# Patient Record
Sex: FEMALE | Race: BLACK OR AFRICAN AMERICAN | NOT HISPANIC OR LATINO | Employment: STUDENT | URBAN - METROPOLITAN AREA
[De-identification: names, ages, dates, MRNs, and addresses within clinical notes are randomized per-mention and may not be internally consistent; named-entity substitution may affect disease eponyms.]

---
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
## 2024-01-21 PROBLEM — R10.9 ABDOMINAL PAIN: Status: ACTIVE | Noted: 2024-01-21

## 2024-03-21 ENCOUNTER — TELEPHONE (OUTPATIENT)
Dept: OBSTETRICS AND GYNECOLOGY | Facility: CLINIC | Age: 19
End: 2024-03-21
Payer: MEDICAID

## 2024-03-21 NOTE — TELEPHONE ENCOUNTER
Called patient to schedule appt                ----- Message from Matt Hines sent at 3/21/2024  4:29 PM CDT -----  Who Called:GRUPO GOODEN [[id number]]              Who Left Message for Patient:Roslyn Alberto              Does the patient know what this is regarding? Apt              Best Call Back Number:[phone]              Additional Information: None

## 2024-03-27 PROBLEM — N83.202 CYST OF LEFT OVARY: Status: ACTIVE | Noted: 2024-03-27

## 2024-04-15 ENCOUNTER — OFFICE VISIT (OUTPATIENT)
Dept: OBSTETRICS AND GYNECOLOGY | Facility: CLINIC | Age: 19
End: 2024-04-15
Payer: MEDICAID

## 2024-04-15 VITALS
WEIGHT: 96.56 LBS | BODY MASS INDEX: 18.23 KG/M2 | HEIGHT: 61 IN | DIASTOLIC BLOOD PRESSURE: 74 MMHG | SYSTOLIC BLOOD PRESSURE: 107 MMHG

## 2024-04-15 DIAGNOSIS — N83.202 LEFT OVARIAN CYST: Primary | ICD-10-CM

## 2024-04-15 LAB
B-HCG UR QL: NEGATIVE
CTP QC/QA: YES

## 2024-04-15 PROCEDURE — 99999 PR PBB SHADOW E&M-EST. PATIENT-LVL III: CPT | Mod: PBBFAC,,, | Performed by: OBSTETRICS & GYNECOLOGY

## 2024-04-15 PROCEDURE — 1159F MED LIST DOCD IN RCRD: CPT | Mod: CPTII,,, | Performed by: OBSTETRICS & GYNECOLOGY

## 2024-04-15 PROCEDURE — 3078F DIAST BP <80 MM HG: CPT | Mod: CPTII,,, | Performed by: OBSTETRICS & GYNECOLOGY

## 2024-04-15 PROCEDURE — 81025 URINE PREGNANCY TEST: CPT | Mod: PBBFAC,PN | Performed by: OBSTETRICS & GYNECOLOGY

## 2024-04-15 PROCEDURE — 99999PBSHW POCT URINE PREGNANCY: Mod: PBBFAC,,,

## 2024-04-15 PROCEDURE — 99213 OFFICE O/P EST LOW 20 MIN: CPT | Mod: PBBFAC,PN | Performed by: OBSTETRICS & GYNECOLOGY

## 2024-04-15 PROCEDURE — 3008F BODY MASS INDEX DOCD: CPT | Mod: CPTII,,, | Performed by: OBSTETRICS & GYNECOLOGY

## 2024-04-15 PROCEDURE — 99203 OFFICE O/P NEW LOW 30 MIN: CPT | Mod: S$PBB,,, | Performed by: OBSTETRICS & GYNECOLOGY

## 2024-04-15 PROCEDURE — 3074F SYST BP LT 130 MM HG: CPT | Mod: CPTII,,, | Performed by: OBSTETRICS & GYNECOLOGY

## 2024-04-15 RX ORDER — NORGESTIMATE AND ETHINYL ESTRADIOL 0.25-0.035
1 KIT ORAL DAILY
Qty: 28 TABLET | Refills: 3 | Status: SHIPPED | OUTPATIENT
Start: 2024-04-15 | End: 2025-04-15

## 2024-04-15 NOTE — PROGRESS NOTES
"Subjective     Patient ID: Kandi Vasquez is a 18 y.o. female.    Chief Complaint:  Ovarian Cyst      History of Present Illness  HPI  19yo  presents for ER f/u.  Was seen in ER for pelvic pain and CT scan showed 4cm left ovarian cyst.  Treated with toradol in ER and sent home with ibuprofen and norco.  Overall doing well today but does still report cramping.  No h/o cysts in the past.  Cycles can be irregular.  Previously on OCPs but missed doses, then Depo shot but missed appointments, so stopped altogether.  Not currently on any contraception.  She is sexually active, does not use condoms.  No other complaints today.      GYN & OB History  Patient's last menstrual period was 2024 (exact date).   Date of Last Pap: No result found    OB History    Para Term  AB Living   0 0 0 0 0 0   SAB IAB Ectopic Multiple Live Births   0 0 0 0 0       Review of Systems  Review of Systems   Constitutional:  Negative for chills and fever.   Respiratory:  Negative for shortness of breath.    Cardiovascular:  Negative for chest pain.   Gastrointestinal:  Negative for abdominal pain, constipation and diarrhea.   Genitourinary:  Positive for menstrual problem and pelvic pain (cramping). Negative for dyspareunia and vaginal discharge.   Musculoskeletal:  Negative for myalgias.   Neurological:  Positive for headaches (h/o migraines without aura, has not had one in years).          Objective   Physical Exam    /74   Ht 5' 1" (1.549 m)   Wt 43.8 kg (96 lb 9 oz)   LMP 2024 (Exact Date)   BMI 18.25 kg/m²     UPT neg    Gen: NAD  Resp: Normal respiratory effort  Abd: soft, NT  Pelvic: deferred  Ext: normal ROM  Psych: appropriate affect  Neuro: grossly intact    CT Scan (3/20/24): 4.1 x 3.1 x 3.7 cm left adnexal cyst.      Assessment and Plan     Kandi was seen today for ovarian cyst.    Diagnoses and all orders for this visit:    Left ovarian cyst  -     US Pelvis Comp with Transvag NON-OB (xpd; " Future  -     POCT Urine Pregnancy    Other orders  -     norgestimate-ethinyl estradioL (ORTHO-CYCLEN) 0.25-35 mg-mcg per tablet; Take 1 tablet by mouth once daily.          Plan:  Discussed imaging and symptoms with patient.    Discussed treatment options with OCPs - h/o migraines w/o aura.  Has not had migraine in a while and does not take any medication for it.  Discussed risks - if migraines resume, will stop OCPs immediately.  Counseling done, all questions answered.   Will plan treatment with OCPs x 2-3 months, then pelvic US to evaluate resolution of cyst.   Encourage condom use.   Counseling done, all questions answered.  RTC 2-3 months for f/u.

## 2024-07-06 ENCOUNTER — HOSPITAL ENCOUNTER (EMERGENCY)
Facility: OTHER | Age: 19
Discharge: HOME | End: 2024-07-06
Attending: EMERGENCY MEDICINE
Payer: MEDICAID

## 2024-07-06 VITALS
HEART RATE: 65 BPM | HEIGHT: 62 IN | TEMPERATURE: 98 F | OXYGEN SATURATION: 99 % | RESPIRATION RATE: 16 BRPM | BODY MASS INDEX: 18.03 KG/M2 | WEIGHT: 98 LBS | DIASTOLIC BLOOD PRESSURE: 53 MMHG | SYSTOLIC BLOOD PRESSURE: 96 MMHG

## 2024-07-06 DIAGNOSIS — R10.2 PELVIC PAIN: Primary | ICD-10-CM

## 2024-07-06 DIAGNOSIS — N30.00 ACUTE CYSTITIS WITHOUT HEMATURIA: ICD-10-CM

## 2024-07-06 LAB
B-HCG UR QL: NEGATIVE
BACTERIA #/AREA URNS HPF: ABNORMAL /HPF
BACTERIA GENITAL QL WET PREP: ABNORMAL
BILIRUB UR QL STRIP: NEGATIVE
CLARITY UR: ABNORMAL
CLUE CELLS VAG QL WET PREP: ABNORMAL
COLOR UR: YELLOW
CTP QC/QA: YES
FILAMENT FUNGI VAG WET PREP-#/AREA: ABNORMAL
GLUCOSE UR QL STRIP: NEGATIVE
HGB UR QL STRIP: ABNORMAL
KETONES UR QL STRIP: ABNORMAL
LEUKOCYTE ESTERASE UR QL STRIP: ABNORMAL
MICROSCOPIC COMMENT: ABNORMAL
NITRITE UR QL STRIP: NEGATIVE
PH UR STRIP: 6 [PH] (ref 5–8)
PROT UR QL STRIP: ABNORMAL
RBC #/AREA URNS HPF: 19 /HPF (ref 0–4)
SP GR UR STRIP: 1.01 (ref 1–1.03)
SPECIMEN SOURCE: ABNORMAL
SQUAMOUS #/AREA URNS HPF: 2 /HPF
T VAGINALIS GENITAL QL WET PREP: ABNORMAL
URN SPEC COLLECT METH UR: ABNORMAL
UROBILINOGEN UR STRIP-ACNC: NEGATIVE EU/DL
WBC #/AREA URNS HPF: 24 /HPF (ref 0–5)
WBC #/AREA VAG WET PREP: ABNORMAL
YEAST GENITAL QL WET PREP: ABNORMAL

## 2024-07-06 PROCEDURE — 87591 N.GONORRHOEAE DNA AMP PROB: CPT | Performed by: EMERGENCY MEDICINE

## 2024-07-06 PROCEDURE — 81000 URINALYSIS NONAUTO W/SCOPE: CPT | Performed by: EMERGENCY MEDICINE

## 2024-07-06 PROCEDURE — 99284 EMERGENCY DEPT VISIT MOD MDM: CPT | Mod: 25

## 2024-07-06 PROCEDURE — 81025 URINE PREGNANCY TEST: CPT | Performed by: EMERGENCY MEDICINE

## 2024-07-06 PROCEDURE — 25000003 PHARM REV CODE 250: Performed by: EMERGENCY MEDICINE

## 2024-07-06 PROCEDURE — 87491 CHLMYD TRACH DNA AMP PROBE: CPT | Performed by: EMERGENCY MEDICINE

## 2024-07-06 PROCEDURE — 87210 SMEAR WET MOUNT SALINE/INK: CPT | Performed by: EMERGENCY MEDICINE

## 2024-07-06 RX ORDER — NITROFURANTOIN 25; 75 MG/1; MG/1
100 CAPSULE ORAL
Status: COMPLETED | OUTPATIENT
Start: 2024-07-06 | End: 2024-07-06

## 2024-07-06 RX ORDER — NITROFURANTOIN 25; 75 MG/1; MG/1
100 CAPSULE ORAL 2 TIMES DAILY
Qty: 10 CAPSULE | Refills: 0 | Status: SHIPPED | OUTPATIENT
Start: 2024-07-06 | End: 2024-07-11

## 2024-07-06 RX ADMIN — NITROFURANTOIN MONOHYDRATE/MACROCRYSTALS 100 MG: 25; 75 CAPSULE ORAL at 11:07

## 2024-07-06 NOTE — ED PROVIDER NOTES
Encounter Date: 7/6/2024       History     Chief Complaint   Patient presents with    Abdominal Pain     [facility] with complaints of having LLQ abdominal pain with nausea and vomiting. Patient states having a hx of ovarian cysts. Treated with Toradol and Zofran prior to arrival.      Seen by physician at 6:45AM:    Patient is a 18-year-old female who presents to the emergency department with acute onset of left lower quadrant pain.  Patient has a history of ovarian cyst.  She started her menstrual cycle 2 days ago which are typically fairly painful.  However she started having left-sided pelvic pain which was significantly worse than her baseline menstrual cramps.  She does have a history of a left-sided ovarian cyst which she recently started on OCPs for.  She started having vomiting last night.  She has frequent stools but denies any diarrhea.  Denies any abdominal surgery history.  Denies any fevers/chills.  Denies any chest pain or shortness breath.  She was seen by EMS and given Toradol and Zofran which has improved her symptoms.        Review of patient's allergies indicates:  No Known Allergies  Past Medical History:   Diagnosis Date    Migraine headache     Ovarian cyst      History reviewed. No pertinent surgical history.  Family History   Problem Relation Name Age of Onset    No Known Problems Mother       Social History     Tobacco Use    Smoking status: Never    Smokeless tobacco: Never   Substance Use Topics    Alcohol use: Never    Drug use: Never     Review of Systems   Constitutional:  Negative for chills and fever.   HENT:  Negative for congestion and rhinorrhea.    Respiratory:  Negative for chest tightness and shortness of breath.    Cardiovascular:  Negative for chest pain and palpitations.   Gastrointestinal:  Negative for abdominal pain, diarrhea, nausea and vomiting.   Genitourinary:  Positive for pelvic pain. Negative for dysuria and flank pain.    Musculoskeletal:  Negative for back pain and neck pain.   Skin:  Negative for color change and wound.   Neurological:  Negative for dizziness and headaches.       Physical Exam     Initial Vitals [07/06/24 0631]   BP Pulse Resp Temp SpO2   108/76 60 14 98.3 °F (36.8 °C) 100 %      MAP       --         Physical Exam    Nursing note and vitals reviewed.  Constitutional: She appears well-developed and well-nourished.   HENT:   Head: Normocephalic and atraumatic.   Eyes: Conjunctivae are normal.   Neck: Neck supple.   Normal range of motion.  Cardiovascular:  Normal rate, regular rhythm and normal heart sounds.           Pulmonary/Chest: Breath sounds normal. No respiratory distress. She has no wheezes. She has no rales.   Abdominal: Abdomen is soft. Bowel sounds are normal. She exhibits no distension. There is no abdominal tenderness. There is no rebound.   Genitourinary:    Genitourinary Comments: Mild left lower pelvic tenderness.  No CMT or adnexal tenderness bilaterally.  Though significant vaginal discharge or blood.  Os closed.  Female chaperone at bedside.     Musculoskeletal:         General: Normal range of motion.      Cervical back: Normal range of motion and neck supple.     Neurological: She is alert and oriented to person, place, and time.   Ambulatory with steady gait.   Skin: Skin is warm and dry. Capillary refill takes less than 2 seconds.         ED Course   Procedures  Labs Reviewed   VAGINAL SCREEN - Abnormal; Notable for the following components:       Result Value    WBC - Vaginal Screen Occasional (*)     Bacteria - Vaginal Screen Few (*)     All other components within normal limits    Narrative:     Release to patient->Immediate   URINALYSIS - Abnormal; Notable for the following components:    Appearance, UA Hazy (*)     Protein, UA Trace (*)     Ketones, UA 1+ (*)     Occult Blood UA 2+ (*)     Leukocytes, UA 3+ (*)     All other components within normal limits   URINALYSIS MICROSCOPIC -  Abnormal; Notable for the following components:    RBC, UA 19 (*)     WBC, UA 24 (*)     Bacteria Few (*)     All other components within normal limits   POCT URINE PREGNANCY - Normal   C. TRACHOMATIS/N. GONORRHOEAE BY AMP DNA          Imaging Results              US Pelvis Comp with Transvag NON-OB (xpd (Final result)  Result time 07/06/24 09:18:05      Final result by Prakash Montgomery MD (07/06/24 09:18:05)                   Impression:      No acute sonographic abnormality identified in the pelvis.      Electronically signed by: Prakash Montgomery MD  Date:    07/06/2024  Time:    09:18               Narrative:    EXAMINATION:  US PELVIS COMP WITH TRANSVAG NON-OB (XPD)    CLINICAL HISTORY:  pelvic pain;    COMPARISON:  CT, 03/20/2024.    TECHNIQUE:  Transabdominal and transvaginal pelvic ultrasound were performed utilizing grayscale ultrasound and color Doppler.    FINDINGS:  Uterus measures 7.9 x 3.6 x 5.4 cm and is retroverted.  No focal abnormality.  The endometrial stripe is not thickened measuring 0.8 cm.    Right ovary measures 3.4 x 1.8 x 2.8 cm.  Left ovary measures 3.5 x 1.6 x 2.9 cm and contains a 1.8 cm thick walled follicle with rim vascularity, likely a corpus luteum.  There is normal arterial and venous flow bilaterally.    There is no significant free fluid within the pelvis.                                       Medications   nitrofurantoin (macrocrystal-monohydrate) 100 MG capsule 100 mg (100 mg Oral Given 7/6/24 1109)     Medical Decision Making  6:45AM:  Patient is a 18-year-old female who presents to the emergency department with left-sided pelvic pain.  Patient does have a history of an ovarian cyst on that side.  Will plan for pelvic swabs, ultrasound, will continue to follow and reassess.    Amount and/or Complexity of Data Reviewed  External Data Reviewed: notes.  Labs: ordered. Decision-making details documented in ED Course.  Radiology: ordered. Decision-making details documented in ED  Course.    Risk  Prescription drug management.    10:44 AM:  Patient doing well, remains stable.  She is feeling better.  Her ultrasound was negative for any evidence of a significant cyst or torsion.  Her urine is suspicious for UTI with 3+ leukocyte esterase, 24 WBCs and few bacteria.  Given her pelvic pain, will plan to treat with a course of antibiotics along with a prescription for NSAIDs.  I do not feel that further work up in the ED is indicated at this time.  I updated pt regarding results and I counseled pt regarding supportive care measures.  I have discussed with the pt ED return warnings and need for close PCP f/u.  Pt agreeable to plan and all questions answered.  I feel that pt is stable for discharge and management as an outpatient and no further intervention is needed at this time.  Pt is comfortable returning to the ED if needed.  Will DC home in stable condition.                                    Clinical Impression:  Final diagnoses:  [R10.2] Pelvic pain (Primary)  [N30.00] Acute cystitis without hematuria          ED Disposition Condition    Discharge Stable          ED Prescriptions       Medication Sig Dispense Start Date End Date Auth. Provider    nitrofurantoin, macrocrystal-monohydrate, (MACROBID) 100 MG capsule Take 1 capsule (100 mg total) by mouth 2 (two) times daily. for 5 days 10 capsule 7/6/2024 7/11/2024 Juanis Loja MD          Follow-up Information       Follow up With Specialties Details Why Contact Info    Roslyn White, NP Family Medicine   [address] [place] [address] [place]  [place] [address]  [phone]               Juanis Loja MD  07/06/24 3651

## 2024-07-06 NOTE — ED NOTES
Per pt she began having pain to her left pelvis on 07/04 reports cycle started on 07/04 pain is more intense than baseline. Reports heavier than normal flow with heavy blood clots. Last cycle  April 2024, pt is typically regular

## 2024-07-09 LAB
C TRACH DNA SPEC QL NAA+PROBE: NOT DETECTED
N GONORRHOEA DNA SPEC QL NAA+PROBE: NOT DETECTED